# Patient Record
Sex: MALE | Race: WHITE | NOT HISPANIC OR LATINO
[De-identification: names, ages, dates, MRNs, and addresses within clinical notes are randomized per-mention and may not be internally consistent; named-entity substitution may affect disease eponyms.]

---

## 2024-07-05 ENCOUNTER — DASHBOARD ENCOUNTERS (OUTPATIENT)
Age: 69
End: 2024-07-05

## 2024-07-05 ENCOUNTER — OFFICE VISIT (OUTPATIENT)
Dept: URBAN - METROPOLITAN AREA CLINIC 63 | Facility: CLINIC | Age: 69
End: 2024-07-05
Payer: MEDICARE

## 2024-07-05 VITALS
SYSTOLIC BLOOD PRESSURE: 124 MMHG | DIASTOLIC BLOOD PRESSURE: 82 MMHG | WEIGHT: 188 LBS | OXYGEN SATURATION: 97 % | HEART RATE: 79 BPM | TEMPERATURE: 98.5 F | HEIGHT: 68 IN | BODY MASS INDEX: 28.49 KG/M2

## 2024-07-05 DIAGNOSIS — R93.3 IMAGING OF GASTROINTESTINAL TRACT ABNORMAL: ICD-10-CM

## 2024-07-05 DIAGNOSIS — Z79.01 ANTICOAGULATED: ICD-10-CM

## 2024-07-05 PROBLEM — 309298003: Status: ACTIVE | Noted: 2024-07-05

## 2024-07-05 PROCEDURE — 99203 OFFICE O/P NEW LOW 30 MIN: CPT

## 2024-07-05 RX ORDER — NORTRIPTYLINE HYDROCHLORIDE 25 MG/1
CAPSULE ORAL
Qty: 90 CAPSULE | Status: ACTIVE | COMMUNITY

## 2024-07-05 RX ORDER — METOPROLOL SUCCINATE 50 MG/1
TAKE 1 AND 1/2 TABLETS BY MOUTH TWICE DAILY TABLET, EXTENDED RELEASE ORAL
Qty: 270 EACH | Refills: 0 | Status: ACTIVE | COMMUNITY

## 2024-07-05 RX ORDER — APIXABAN 5 MG/1
AS DIRECTED TABLET, FILM COATED ORAL
Status: ACTIVE | COMMUNITY

## 2024-07-05 RX ORDER — OMEPRAZOLE 20 MG/1
1 CAPSULE 30 MINUTES BEFORE MORNING MEAL CAPSULE, DELAYED RELEASE ORAL ONCE A DAY
Status: ACTIVE | COMMUNITY

## 2024-07-05 RX ORDER — OXYCODONE HYDROCHLORIDE AND ACETAMINOPHEN 10; 325 MG/1; MG/1
TABLET ORAL
Qty: 150 TABLET | Status: ACTIVE | COMMUNITY

## 2024-07-05 RX ORDER — ATORVASTATIN CALCIUM 80 MG/1
1 TABLET TABLET, FILM COATED ORAL ONCE A DAY
Status: ACTIVE | COMMUNITY

## 2024-07-05 RX ORDER — PREGABALIN 150 MG/1
CAPSULE ORAL
Qty: 60 CAPSULE | Status: ACTIVE | COMMUNITY

## 2024-07-05 RX ORDER — TOPIRAMATE 100 MG/1
1 TABLET TABLET, FILM COATED ORAL TWICE A DAY
Status: ACTIVE | COMMUNITY

## 2024-07-05 NOTE — HPI-TODAY'S VISIT:
Patient is a 69-year-old male with a past medical history of GERD, hypertension, diabetes type 2, hyperlipidemia, COPD and chronic pain.  He is referred to Dr. Wellington's practice due to abnormal PET scan of colon. Patient recently in hospital 5/16/2024 to 6/5/2024 for sudden onset shortness of breath and found to have large left pneumothorax s/p 2 pigtail placements with persistent leak and subcutaneous emphysema.  He underwent left VATS with CT surgery and JACQUES wedge resection with pathology positive for adenocarcinoma and referred to medical oncology and radiation oncology.  Echo 6/5/2024 with LVEF 60% mild mitral valve regurgitation.  He established with [facility] oncology 7/3/2024 and is holding off on any treatment until after his cruise.  Additional past medical history of atrial flutter on Eliquis, old lacunar stroke, hypertension, hyperlipidemia, type 2 diabetes, GERD and COPD.  PET scan from CT skull base to mid thigh 6/25/2024 showing no abnormal uptake in the head and neck, diffuse intense activity noted along watch resection suture line contiguous with pleural surfaces of the left thorax, hypermetabolic AP window lymph node, milder FDG accumulation in the left hilum and intense focal FDG accumulation is present in the cecum with no discrete mass visualized on noncontrast CT images with follow-up colonoscopy recommended to exclude neoplasm. Will need chemotherapy and radiation x 4.  His last colonoscopy was 9 years ago. No family history of colon cancer. He does get occasionally constipated and will eat high-fiber foods or drink apple juice which works well to alleviate his constipation.  Denies any other GI symptoms.  No signs of GI bleeding or abdominal pain.  He is currently on Eliquis for atrial flutter following with [facility].  He has established with [facility] in pulmonology and will need clearance from them as well.  He denies any pacemaker or defibrillator.  No recent MI or cardiac stenting.  His recent echo with EF over 60%. Abdomen soft, non-tender, no rebound, no guarding.

## 2024-07-09 ENCOUNTER — TELEPHONE ENCOUNTER (OUTPATIENT)
Dept: URBAN - METROPOLITAN AREA CLINIC 63 | Facility: CLINIC | Age: 69
End: 2024-07-09

## 2024-07-11 ENCOUNTER — TELEPHONE ENCOUNTER (OUTPATIENT)
Dept: URBAN - METROPOLITAN AREA CLINIC 63 | Facility: CLINIC | Age: 69
End: 2024-07-11

## 2024-07-12 ENCOUNTER — LAB OUTSIDE AN ENCOUNTER (OUTPATIENT)
Dept: URBAN - METROPOLITAN AREA CLINIC 63 | Facility: CLINIC | Age: 69
End: 2024-07-12

## 2024-07-16 ENCOUNTER — TELEPHONE ENCOUNTER (OUTPATIENT)
Dept: URBAN - METROPOLITAN AREA CLINIC 63 | Facility: CLINIC | Age: 69
End: 2024-07-16

## 2024-07-24 ENCOUNTER — CLAIMS CREATED FROM THE CLAIM WINDOW (OUTPATIENT)
Dept: URBAN - METROPOLITAN AREA CLINIC 4 | Facility: CLINIC | Age: 69
End: 2024-07-24
Payer: MEDICARE

## 2024-07-24 ENCOUNTER — CLAIMS CREATED FROM THE CLAIM WINDOW (OUTPATIENT)
Dept: URBAN - METROPOLITAN AREA SURGERY CENTER 4 | Facility: SURGERY CENTER | Age: 69
End: 2024-07-24
Payer: MEDICARE

## 2024-07-24 DIAGNOSIS — K63.5 POLYP OF ASCENDING COLON, UNSPECIFIED TYPE: ICD-10-CM

## 2024-07-24 DIAGNOSIS — D12.0 BENIGN NEOPLASM OF CECUM: ICD-10-CM

## 2024-07-24 DIAGNOSIS — K64.1 SECOND DEGREE HEMORRHOIDS: ICD-10-CM

## 2024-07-24 DIAGNOSIS — R93.3 ABNORMAL FINDINGS ON DIAGNOSTIC IMAGING OF OTHER PARTS OF DIGESTIVE TRACT: ICD-10-CM

## 2024-07-24 DIAGNOSIS — D12.0 ADENOMATOUS POLYP OF CECUM: ICD-10-CM

## 2024-07-24 DIAGNOSIS — R94.8 ABNORMAL PET SCAN OF COLON: ICD-10-CM

## 2024-07-24 DIAGNOSIS — D12.2 BENIGN NEOPLASM OF ASCENDING COLON: ICD-10-CM

## 2024-07-24 DIAGNOSIS — D12.2 ADENOMATOUS POLYP OF ASCENDING COLON: ICD-10-CM

## 2024-07-24 PROCEDURE — 00811 ANES LWR INTST NDSC NOS: CPT | Performed by: NURSE ANESTHETIST, CERTIFIED REGISTERED

## 2024-07-24 PROCEDURE — 45385 COLONOSCOPY W/LESION REMOVAL: CPT | Performed by: INTERNAL MEDICINE

## 2024-07-24 PROCEDURE — 88305 TISSUE EXAM BY PATHOLOGIST: CPT | Performed by: PATHOLOGY

## 2024-07-24 RX ORDER — PREGABALIN 150 MG/1
CAPSULE ORAL
Qty: 60 CAPSULE | Status: ACTIVE | COMMUNITY

## 2024-07-24 RX ORDER — ATORVASTATIN CALCIUM 80 MG/1
1 TABLET TABLET, FILM COATED ORAL ONCE A DAY
Status: ACTIVE | COMMUNITY

## 2024-07-24 RX ORDER — OXYCODONE HYDROCHLORIDE AND ACETAMINOPHEN 10; 325 MG/1; MG/1
TABLET ORAL
Qty: 150 TABLET | Status: ACTIVE | COMMUNITY

## 2024-07-24 RX ORDER — OMEPRAZOLE 20 MG/1
1 CAPSULE 30 MINUTES BEFORE MORNING MEAL CAPSULE, DELAYED RELEASE ORAL ONCE A DAY
Status: ACTIVE | COMMUNITY

## 2024-07-24 RX ORDER — TOPIRAMATE 100 MG/1
1 TABLET TABLET, FILM COATED ORAL TWICE A DAY
Status: ACTIVE | COMMUNITY

## 2024-07-24 RX ORDER — APIXABAN 5 MG/1
AS DIRECTED TABLET, FILM COATED ORAL
Status: ACTIVE | COMMUNITY

## 2024-07-24 RX ORDER — NORTRIPTYLINE HYDROCHLORIDE 25 MG/1
CAPSULE ORAL
Qty: 90 CAPSULE | Status: ACTIVE | COMMUNITY

## 2024-07-24 RX ORDER — METOPROLOL SUCCINATE 50 MG/1
TAKE 1 AND 1/2 TABLETS BY MOUTH TWICE DAILY TABLET, EXTENDED RELEASE ORAL
Qty: 270 EACH | Refills: 0 | Status: ACTIVE | COMMUNITY

## 2024-09-03 ENCOUNTER — OFFICE VISIT (OUTPATIENT)
Dept: URBAN - METROPOLITAN AREA SURGERY CENTER 4 | Facility: SURGERY CENTER | Age: 69
End: 2024-09-03

## 2024-09-19 ENCOUNTER — OFFICE VISIT (OUTPATIENT)
Dept: URBAN - METROPOLITAN AREA CLINIC 63 | Facility: CLINIC | Age: 69
End: 2024-09-19